# Patient Record
(demographics unavailable — no encounter records)

---

## 2024-10-14 NOTE — ASSESSMENT
[FreeTextEntry1] : #HFrEF #ICM s/p CABG (LIMA-LAD, SVG-D1, SVG-RPL) #HTN #HLD #DM #PAD He continues to do well from a cardiac perspective but continues to have ongoing PAD issues. He has ischecmi ACC heart failure stage C and I estimate his functional status as NYHA class III. He is scheduled for further revascularization this week.  On exam he is warm, well perfused, and euvolemic. He does not require standing diuretics.  We discussed importance of further optimizing GDMT.  BP/HR with room for improvement.  Will increase metoprolol succinate to 50 mg daily and Entresto to 49-51 mg twice daily.  He is already on Jardiance 25 mg daily.  Since he is doing well and LV function has recovered, will hold off on MRA at this time.  Plan -GDMT BB-increase metoprolol succinate 50 mg daily ACEi/ARB/ARNI-increase Entresto 49-51 mg twice daily  SGLT2i- Jardiance 25 mg daily MRA- can hold off for now as he is doing well and LV function has improved Diuretic- none Device- none, LVEF 48% -Continue ASA/Plavix post CABG -Continue high intensity statin -RTC in 4-6 months -Vascular management for PAD  Koby Ventura MD Interventional Cardiology/Advanced Heart Failure Transplant [facility].

## 2024-10-14 NOTE — CARDIOLOGY SUMMARY
[de-identified] : NSR HR 60, anterior q waves [de-identified] : TTE 4/2024 LVEF 48%, Apical septal segment, apical inferior segment, and apex are abnormal.  12/10/23 1. Moderately to severely decreased global left ventricular systolic function.  2. LV Ejection Fraction by Adair's Method with a biplane EF of 36 %.  3. Spectral Doppler shows impaired relaxation pattern of left ventricular myocardial filling (Grade I diastolic dysfunction).  4. Mid anterior and anteroapical hypokinesis inferoapical and anterolateral hypokinesis.  5. Mildly enlarged left atrium.  6. Mild mitral valve regurgitation.  7. Sclerotic aortic valve with normal opening.   [de-identified] : 12/8/2023 LM: Mild disease LAD: 99% mid disease at the bifurcation with D1. D1: 99% ostial disease RI: Mild disease CX: 99% ostial disease OM1: Moderate disease RCA: 90% mid disease. 90% distal disease RPDA: Moderate disease RPL: Moderate disease   [de-identified] : R: diffuse calcification. SFA is occluded. Popliteal with severely diminished flow L: diffuse calcification. SFA is occluded. Popliteal with severely diminished flow

## 2024-10-14 NOTE — CARDIOLOGY SUMMARY
[de-identified] : NSR HR 60, anterior q waves [de-identified] : TTE 4/2024 LVEF 48%, Apical septal segment, apical inferior segment, and apex are abnormal.  12/10/23 1. Moderately to severely decreased global left ventricular systolic function.  2. LV Ejection Fraction by Adair's Method with a biplane EF of 36 %.  3. Spectral Doppler shows impaired relaxation pattern of left ventricular myocardial filling (Grade I diastolic dysfunction).  4. Mid anterior and anteroapical hypokinesis inferoapical and anterolateral hypokinesis.  5. Mildly enlarged left atrium.  6. Mild mitral valve regurgitation.  7. Sclerotic aortic valve with normal opening.   [de-identified] : 12/8/2023 LM: Mild disease LAD: 99% mid disease at the bifurcation with D1. D1: 99% ostial disease RI: Mild disease CX: 99% ostial disease OM1: Moderate disease RCA: 90% mid disease. 90% distal disease RPDA: Moderate disease RPL: Moderate disease   [de-identified] : R: diffuse calcification. SFA is occluded. Popliteal with severely diminished flow L: diffuse calcification. SFA is occluded. Popliteal with severely diminished flow

## 2024-10-14 NOTE — HISTORY OF PRESENT ILLNESS
[FreeTextEntry1] : 75 y/o male PMH ICM. CAD, HFrEF LVEF 35% PAD, HTN, HLD, DM who presents to establish care.  Patient was admitted from 12/8/2023-12/12/2023 from cardiology clinic. He had originally presented for preoperative evaluation prior to possible vascular surgery. He was sent immediately for urgent cath from the office due to a concerning ECG with anterior CASIMIRO and anterior Q waves. Patient notably had been overall asymptomatic from anginal perspective and troponin was never elevated. LHC demonstrated severe 3VD and echo revealed moderately depressed LV systolic function. After heart team discussion, decision made to pursue CABG as outpatient while optimizing his heart failure.  Presented to HF clinic on 12/27/23 to establish care. He has been doing well since his hospital admission. He reports no limitation in his activities except for pain in his legs. He does note occasional fatigue. He has been compliant with his medications and reports no side effects. His weight has been stable at 153 lbs. He has a good appetite. He underwent successful CABG (LIMA-LAD, SVG-D1, SVG-RPL) on 2/5/24 with Dr. Christopher. His Entresto was stopped at discharge due to hypotension and metoprolol was decreased. Last saw Dr. Christopher and Dr. Anderson on 2/15/24 for post op follow up and reportedly doing well. Clinic visit 2/21/24 for follow up. He is recovering well from his surgery and is overall doing well. He is participating in rehab. He does continue to have LE pain/fatigue when walking. Metoprolol and Entresto were restarted. He underwent R Iliac intervention with vascular in April 2024. Clinic visit 5/22/24.. He continues to do well from a cardiac standpoint but continues to have significant claudication of his LLE. Follow up TTE in April revealed in improvement in LV function with LVEF 48%. Unfortunately, he reports no significant improvement of his claudication of his RLE. His functional status is mainly limited by his leg pain. He is scheduled for angiogram of LLE next month. He is compliant with his medications. No changes made.  Clinic visit 10/14/24.  He has been doing well since his last visit.  He reports no change in functional status.  His only limitation has been his right leg.  He is scheduled for repeat right peripheral intervention.  He has been compliant with his medications.  SBP ~130.  Weight stable ~150. Denies fatigue, dizziness, lightheadedness, syncope, or presyncope. Denies chest discomfort, dyspnea, palpitations. Denies weight gain, edema, PND, orthopnea, bendopnea. Denies abdominal pain, n/v/d/c. Denies bleeding. Denies fevers/chills, lymph nodes, or skin changes. No recent hospitalizations.

## 2024-10-14 NOTE — ASSESSMENT
[FreeTextEntry1] : #HFrEF #ICM s/p CABG (LIMA-LAD, SVG-D1, SVG-RPL) #HTN #HLD #DM #PAD He continues to do well from a cardiac perspective but continues to have ongoing PAD issues. He has ischecmi ACC heart failure stage C and I estimate his functional status as NYHA class III. He is scheduled for further revascularization this week.  On exam he is warm, well perfused, and euvolemic. He does not require standing diuretics.  We discussed importance of further optimizing GDMT.  BP/HR with room for improvement.  Will increase metoprolol succinate to 50 mg daily and Entresto to 49-51 mg twice daily.  He is already on Jardiance 25 mg daily.  Since he is doing well and LV function has recovered, will hold off on MRA at this time.  Plan -GDMT BB-increase metoprolol succinate 50 mg daily ACEi/ARB/ARNI-increase Entresto 49-51 mg twice daily  SGLT2i- Jardiance 25 mg daily MRA- can hold off for now as he is doing well and LV function has improved Diuretic- none Device- none, LVEF 48% -Continue ASA/Plavix post CABG -Continue high intensity statin -RTC in 4-6 months -Vascular management for PAD  Koby Ventura MD Interventional Cardiology/Advanced Heart Failure Transplant [facility].

## 2024-10-14 NOTE — HISTORY OF PRESENT ILLNESS
[FreeTextEntry1] : 73 y/o male PMH ICM. CAD, HFrEF LVEF 35% PAD, HTN, HLD, DM who presents to establish care.  Patient was admitted from 12/8/2023-12/12/2023 from cardiology clinic. He had originally presented for preoperative evaluation prior to possible vascular surgery. He was sent immediately for urgent cath from the office due to a concerning ECG with anterior CASIMIRO and anterior Q waves. Patient notably had been overall asymptomatic from anginal perspective and troponin was never elevated. LHC demonstrated severe 3VD and echo revealed moderately depressed LV systolic function. After heart team discussion, decision made to pursue CABG as outpatient while optimizing his heart failure.  Presented to HF clinic on 12/27/23 to establish care. He has been doing well since his hospital admission. He reports no limitation in his activities except for pain in his legs. He does note occasional fatigue. He has been compliant with his medications and reports no side effects. His weight has been stable at 153 lbs. He has a good appetite. He underwent successful CABG (LIMA-LAD, SVG-D1, SVG-RPL) on 2/5/24 with Dr. Christopher. His Entresto was stopped at discharge due to hypotension and metoprolol was decreased. Last saw Dr. Christopher and Dr. Anderson on 2/15/24 for post op follow up and reportedly doing well. Clinic visit 2/21/24 for follow up. He is recovering well from his surgery and is overall doing well. He is participating in rehab. He does continue to have LE pain/fatigue when walking. Metoprolol and Entresto were restarted. He underwent R Iliac intervention with vascular in April 2024. Clinic visit 5/22/24.. He continues to do well from a cardiac standpoint but continues to have significant claudication of his LLE. Follow up TTE in April revealed in improvement in LV function with LVEF 48%. Unfortunately, he reports no significant improvement of his claudication of his RLE. His functional status is mainly limited by his leg pain. He is scheduled for angiogram of LLE next month. He is compliant with his medications. No changes made.  Clinic visit 10/14/24.  He has been doing well since his last visit.  He reports no change in functional status.  His only limitation has been his right leg.  He is scheduled for repeat right peripheral intervention.  He has been compliant with his medications.  SBP ~130.  Weight stable ~150. Denies fatigue, dizziness, lightheadedness, syncope, or presyncope. Denies chest discomfort, dyspnea, palpitations. Denies weight gain, edema, PND, orthopnea, bendopnea. Denies abdominal pain, n/v/d/c. Denies bleeding. Denies fevers/chills, lymph nodes, or skin changes. No recent hospitalizations.

## 2024-11-11 NOTE — HISTORY OF PRESENT ILLNESS
[FreeTextEntry1] : 73 y/o male PMH ICM. CAD, HFrEF LVEF 35% PAD, HTN, HLD, DM who presents to establish care.  Patient was admitted from 12/8/2023-12/12/2023 from cardiology clinic. He had originally presented for preoperative evaluation prior to possible vascular surgery. He was sent immediately for urgent cath from the office due to a concerning ECG with anterior CASIMIRO and anterior Q waves. Patient notably had been overall asymptomatic from anginal perspective and troponin was never elevated. LHC demonstrated severe 3VD and echo revealed moderately depressed LV systolic function. After heart team discussion, decision made to pursue CABG as outpatient while optimizing his heart failure.  Presented to HF clinic on 12/27/23 to establish care. He has been doing well since his hospital admission. He reports no limitation in his activities except for pain in his legs. He does note occasional fatigue. He has been compliant with his medications and reports no side effects. His weight has been stable at 153 lbs. He has a good appetite. He underwent successful CABG (LIMA-LAD, SVG-D1, SVG-RPL) on 2/5/24 with Dr. Christopher. His Entresto was stopped at discharge due to hypotension and metoprolol was decreased. Last saw Dr. Christopher and Dr. Anderson on 2/15/24 for post op follow up and reportedly doing well. Clinic visit 2/21/24 for follow up. He is recovering well from his surgery and is overall doing well. He is participating in rehab. He does continue to have LE pain/fatigue when walking. Metoprolol and Entresto were restarted. He underwent R Iliac intervention with vascular in April 2024. Clinic visit 5/22/24.. He continues to do well from a cardiac standpoint but continues to have significant claudication of his LLE. Follow up TTE in April revealed in improvement in LV function with LVEF 48%. Unfortunately, he reports no significant improvement of his claudication of his RLE. His functional status is mainly limited by his leg pain. He is scheduled for angiogram of LLE next month. He is compliant with his medications. No changes made. Clinic visit 10/14/24. He has been doing well since his last visit. He reports no change in functional status. His only limitation has been his right leg. He is scheduled for repeat right peripheral intervention. He has been compliant with his medications. SBP ~130. Weight stable ~150. Entresto increased to 49-51 mg BID. Hospital admission 10/16/2024 - 10/24/2024 for for R lower extremity peripheral intervention.  Course was complicated by left renal hematoma requiring emergent IR embolization.  Etiology of hematoma is unclear.  Subsequent hospital admission 11/6/2024 - 11/10/2024 for C. difficile colitis.  Clinic visit 11/11/2024. Since his multiple and prolonged hospitalizations he has felt weak and tired.  However he is still in good spirits and is slowly recovering.  During his admission patient with significant lower extremity swelling likely in the setting of fluid resuscitation.  Patient reportedly had been told to restart Entresto at home and Bumex.  These medications were on hold during his hospital admission given his critical illness/SUMIT and CKD. Patient reports that his lower extremity swelling has improved. Diarrhea has improved and still has a short course of PO antibiotics for C. diff. Denies dizziness, lightheadedness, syncope, or presyncope. Denies chest discomfort, dyspnea, palpitations. Denies weight gain, PND, orthopnea, bendopnea. Denies abdominal pain, n/v/d/c. Denies bleeding. Denies fevers/chills, lymph nodes, or skin changes.

## 2024-11-11 NOTE — ASSESSMENT
[FreeTextEntry1] : #HFrEF #ICM s/p CABG (LIMA-LAD, SVG-D1, SVG-RPL) #HTN #HLD #DM #PAD He has had a difficult month with a prolonged hospitalization.  Fortunately he is recovering and did not suffer significant damage.  He has ischemic ACC heart failure stage C and I estimate he has functional status NYHA class III. We discussed focusing on recovering his strength after his prolonged hospitalization.  TTE during hospitalization demonstrated low normal LV systolic function.  We discussed slowly reintroducing GDMT as he recovers from his SUMIT on CKD.  On exam he is warm, well perfused, and euvolemic. Lower extremity swelling likely from aggressive fluid resuscitation from his hospitalization which has now improved.  He has not required standing diuretics in the past and at this time I do not think he requires ongoing diuresis.  Unclear when his Entresto was restarted but most recent labs demonstrate creatinine 2.1 which is slightly above baseline 1.6.  It appears that he is tolerating Entresto with acceptable increase in creatinine and so we will continue at current dose of 49-51 mg twice daily.  He was also restarted on Jardiance 25 mg daily.  HR reasonable today will continue metoprolol succinate at current dose.  Will continue to hold off on MRA as LV function has improved and given CKD would likely not benefit at this time.  He is obtaining labs today from nephrology and can recheck creatinine.  Plan -GDMT BB-metoprolol succinate 50 mg daily ACEi/ARB/ARNI-Entresto 49-51 mg twice daily SGLT2i- Jardiance 25 mg daily MRA- none as LV function has improved Diuretic- stop diuretics Device- none -Continue ASA/Plavix post CABG -Continue high intensity statin -F/u with vascular and nephrology -RTC in 3 months  Koby Ventura MD Interventional Cardiology/Advanced Heart Failure Transplant [facility] - [facility]

## 2024-11-11 NOTE — CARDIOLOGY SUMMARY
[de-identified] : NSR HR 82 [de-identified] : TTE 10/2024: LVEF 50-55%, inferior WMA  4/2024 LVEF 48%, Apical septal segment, apical inferior segment, and apex are abnormal.  12/10/23 1. Moderately to severely decreased global left ventricular systolic function.  2. LV Ejection Fraction by Adair's Method with a biplane EF of 36 %.  3. Spectral Doppler shows impaired relaxation pattern of left ventricular myocardial filling (Grade I diastolic dysfunction).  4. Mid anterior and anteroapical hypokinesis inferoapical and anterolateral hypokinesis.  5. Mildly enlarged left atrium.  6. Mild mitral valve regurgitation.  7. Sclerotic aortic valve with normal opening.  [de-identified] : 12/8/2023 LM: Mild disease LAD: 99% mid disease at the bifurcation with D1. D1: 99% ostial disease RI: Mild disease CX: 99% ostial disease OM1: Moderate disease RCA: 90% mid disease. 90% distal disease RPDA: Moderate disease RPL: Moderate disease   [de-identified] : R: diffuse calcification. SFA is occluded. Popliteal with severely diminished flow L: diffuse calcification. SFA is occluded. Popliteal with severely diminished flow

## 2024-11-11 NOTE — ASSESSMENT
[FreeTextEntry1] : #HFrEF #ICM s/p CABG (LIMA-LAD, SVG-D1, SVG-RPL) #HTN #HLD #DM #PAD He has had a difficult month with a prolonged hospitalization.  Fortunately he is recovering and did not suffer significant damage.  He has ischemic ACC heart failure stage C and I estimate he has functional status NYHA class III. We discussed focusing on recovering his strength after his prolonged hospitalization.  TTE during hospitalization demonstrated low normal LV systolic function.  We discussed slowly reintroducing GDMT as he recovers from his SUMIT on CKD.  On exam he is warm, well perfused, and euvolemic. Lower extremity swelling likely from aggressive fluid resuscitation from his hospitalization which has now improved.  He has not required standing diuretics in the past and at this time I do not think he requires ongoing diuresis.  Unclear when his Entresto was restarted but most recent labs demonstrate creatinine 2.1 which is slightly above baseline 1.6.  It appears that he is tolerating Entresto with acceptable increase in creatinine and so we will continue at current dose of 49-51 mg twice daily.  He was also restarted on Jardiance 25 mg daily.  HR reasonable today will continue metoprolol succinate at current dose.  Will continue to hold off on MRA as LV function has improved and given CKD would likely not benefit at this time.  He is obtaining labs today from nephrology and can recheck creatinine.  Plan -GDMT BB-metoprolol succinate 50 mg daily ACEi/ARB/ARNI-Entresto 49-51 mg twice daily SGLT2i- Jardiance 25 mg daily MRA- none as LV function has improved Diuretic- stop diuretics Device- none -Continue ASA/Plavix post CABG -Continue high intensity statin -F/u with vascular and nephrology -RTC in 3 months  Koby Ventura MD Interventional Cardiology/Advanced Heart Failure Transplant [facility]

## 2024-11-11 NOTE — CARDIOLOGY SUMMARY
[de-identified] : NSR HR 82 [de-identified] : TTE 10/2024: LVEF 50-55%, inferior WMA  4/2024 LVEF 48%, Apical septal segment, apical inferior segment, and apex are abnormal.  12/10/23 1. Moderately to severely decreased global left ventricular systolic function.  2. LV Ejection Fraction by Adair's Method with a biplane EF of 36 %.  3. Spectral Doppler shows impaired relaxation pattern of left ventricular myocardial filling (Grade I diastolic dysfunction).  4. Mid anterior and anteroapical hypokinesis inferoapical and anterolateral hypokinesis.  5. Mildly enlarged left atrium.  6. Mild mitral valve regurgitation.  7. Sclerotic aortic valve with normal opening.  [de-identified] : 12/8/2023 LM: Mild disease LAD: 99% mid disease at the bifurcation with D1. D1: 99% ostial disease RI: Mild disease CX: 99% ostial disease OM1: Moderate disease RCA: 90% mid disease. 90% distal disease RPDA: Moderate disease RPL: Moderate disease   [de-identified] : R: diffuse calcification. SFA is occluded. Popliteal with severely diminished flow L: diffuse calcification. SFA is occluded. Popliteal with severely diminished flow

## 2024-11-18 NOTE — ASSESSMENT
[FreeTextEntry1] : #) CKD stage G3b - will continue to monitor progression of renal dysfunction. The most recent SCr is 2.2 mg/dL with an eGFR of 30 ml/min, which is stable from prior reading of SCr 2.1 mg/dL and eGFR 32 ml/min two weeks prior - etiology of underlying CKD is most likely 2/2 HTN nephrosclerosis vs renovascular disease - will monitor acid base; no bicarbonate supplementation is required at current HCO3 - continue to monitor urine studies for proteinuria - continue to monitor and treat hypercholesterolemia to goal LDL <100  #) CKD - Mineral and Bone disease - continue to monitor Vit D, Phos and PTH for mineral bone disease - start Ergocalciferol 50k weekly x 8 weeks  #) Essential HTN: BP controlled - advised salt restrictive diet of <2.4gm daily - advised to monitor home BP readings and report in future clinic appointments  #) Edema - will start Bumex 0.5mg prn for leg swelling  #) NIDDM: A1c controlled - Continue current use of Lantus 10u Qhs and Jardiance 25mg daily  #) Dyslipidemia: Lipid Panel controlled - Continue use of Statin therapy.  #) Anemia Due to CKD and possibly an acute drop from recent left renal hematoma Hb low, below goal of 10.5 - Checking Iron studies (Ferritin, Iron, TIBC) - Repeating CBC prior to next appointment - Restarting Ferrous Sulfate 325mg daily

## 2024-11-18 NOTE — HISTORY OF PRESENT ILLNESS
[FreeTextEntry1] : Willis Young is a 75 yo M with history of CAD s/p CABG Feb 2024, HFrEF (last EF 50-55% Oct 2024), PAD with right iliac intervention April 2024, current tobacco use, PVD, T2DM, here for follow-up of CKD stage 3A.  Recently hospitalized for worsening leg swelling, attempted revascularization of the LLE leg to left renal hematoma and SUMIT, discharged with sCr now in the low 2's.  Also was hospitalized for C Diff colitis for about a week immediately after.  He was taken off Bumex recently and he notes worsening leg swelling over the last week.  Recently saw Cardiology   Recent Hospitalizations: none Recent Medications changes: none NSAIDS use: denies use  Home BP: no abnormal readings noted  Home FSBS:  no abnormal readings noted  LUTS: denies LUTS, foamy urine or hematuria  Uremic Symptoms: no pruritis, metallic taste, new onset weakness, dysphagia, poor appetite, or tremors noted  FamHx CKD/RRT:  denies  Personal History of CKD/RRT:  denies

## 2024-11-18 NOTE — HISTORY OF PRESENT ILLNESS
[FreeTextEntry1] : Willis Young is a 73 yo M with history of CAD s/p CABG Feb 2024, HFrEF (last EF 50-55% Oct 2024), PAD with right iliac intervention April 2024, current tobacco use, PVD, T2DM, here for follow-up of CKD stage 3A.  Recently hospitalized for worsening leg swelling, attempted revascularization of the LLE leg to left renal hematoma and SUMIT, discharged with sCr now in the low 2's.  Also was hospitalized for C Diff colitis for about a week immediately after.  He was taken off Bumex recently and he notes worsening leg swelling over the last week.  Recently saw Cardiology   Recent Hospitalizations: none Recent Medications changes: none NSAIDS use: denies use  Home BP: no abnormal readings noted  Home FSBS:  no abnormal readings noted  LUTS: denies LUTS, foamy urine or hematuria  Uremic Symptoms: no pruritis, metallic taste, new onset weakness, dysphagia, poor appetite, or tremors noted  FamHx CKD/RRT:  denies  Personal History of CKD/RRT:  denies

## 2025-01-13 NOTE — HISTORY OF PRESENT ILLNESS
[FreeTextEntry1] : Willis Young is a 73 yo M with history of CAD s/p CABG Feb 2024, HFrEF (last EF 50-55% Oct 2024), PAD with right iliac intervention April 2024, current tobacco use, PVD, T2DM, here for follow-up of CKD stage 3A.  Renal function stable with noted sCr 2.2->2.2 in the last 3 months.  RBUS noting stable size of his left renal hematoma, with no LUTS or gross hematuria reported by the patient today.  He notes he is not currently taking Bumex and Vancomycin po has been stopped.  Recent Hospitalizations: none Recent Medications changes: none NSAIDS use: denies use  Home BP: no abnormal readings noted  Home FSBS:  no abnormal readings noted  LUTS: denies LUTS, foamy urine or hematuria  Uremic Symptoms: no pruritis, metallic taste, new onset weakness, dysphagia, poor appetite, or tremors noted  FamHx CKD/RRT:  denies  Personal History of CKD/RRT:  denies

## 2025-01-13 NOTE — ASSESSMENT
[FreeTextEntry1] : #) CKD stage G3bA2: - will continue to monitor progression of renal dysfunction. The most recent SCr is 2.2 mg/dL with an eGFR of 30 ml/min, stable from prior reading - etiology of underlying CKD is most likely 2/2 HTN nephrosclerosis vs renovascular disease - will monitor acid base; no bicarbonate supplementation is required at current HCO3 - continue to monitor urine studies for proteinuria - continue to monitor and treat hypercholesterolemia to goal LDL <100  #) CKD - Mineral and Bone disease - continue to monitor Vit D, Phos and PTH for mineral bone disease - continue Ergocalciferol 50k weekly x 8 weeks - after 8 weeks advised patient to switch to 2000u daily Vit D OTC  #) Essential HTN: BP controlled - advised salt restrictive diet of <2.4gm daily - advised to monitor home BP readings and report in future clinic appointments  #) Edema - resolved, not currently taking Bumex - will CTM  #) NIDDM: A1c controlled on last check - Continue current use of Lantus 10u Qhs and Jardiance 25mg daily  #) Dyslipidemia: Lipid Panel controlled - Continue use of Statin therapy  #) Anemia Due to CKD and possibly an acute drop from recent left renal hematoma Hb improved to 12.8 g/dL on Ferrous Sulfate - continue Ferrous Sulfate 325mg daily

## 2025-02-05 NOTE — ASSESSMENT
[FreeTextEntry1] : Mr. Young is a 75-year-old man with history of multivessel CAD s/p CABG 1/2024, severe peripheral arterial disease, DM2, and tobacco use (quit 2024) presenting for follow up.  Impression: (1) HFrEF 2/2 ICM with EF 36%, improved to 50-55% with revascularization and GDMT, warm and dry on exam (2) Multivessel CAD with CABG 2/2024, Dr. Christopher (3) PAD with bilateral SFA occlusions, s/p intervention with Dr. Esqueda 2024 (4) DM2 (5) CKD, following with renal, had a left renal hematoma after a peripheral intervention requiring emergent IR embolization, stable (6) Prior tobacco use  Plan: - Discussed DM2 control in detail - Continue GDMT, limited by hypotension: BB, ARNI, SGLT2i - Continue DAPT - Continue statin - Vascular follow up re: severe PAD.  RTC 2-3 months with labs

## 2025-02-05 NOTE — HISTORY OF PRESENT ILLNESS
[FreeTextEntry1] : Mr. Young is a 75-year-old man with history of multivessel CAD s/p CABG 1/2024, severe peripheral arterial disease, DM2, and tobacco use (recently quit) presenting for follow up.  Patient reports generally being in decent health. His main issue over the past year is worsening exertional leg pain. He follows with Dr. Esqueda and had LE arterial imaging showing bilateral SFA occlusions and severe tibial disease. He was planned for LE angio and revascularization and in pre-op testing had an abnormal ECG prompting urgent cardiology referral. I saw him on 12/8/23 and noted ECG findings consistent with recent anterior MI and referred him directly to the ED for evaluation, where he was found to have severe 3-vessel CAD along with a reduced EF of 35-40%.  On 2/5/24 he underwent CABG with Dr. Christopher with course complicated by acute blood loss with hypotension prompting transfusion of 1u pRBCs.  Earlier this year underwent RLE angiogram with Dr. Esqueda with right SFA and right iliac artery angioplasty/stenting. A subsequent vascular procedure was complicated by a renal hematoma. He is following with nephrology. Today feels well, denies any active cardiopulmonary complaints.  LHC 12/8/23 - LM luminal irregularities - pLAD 50-70%, mLAD 95% - oLCx 95% - mRCA 85%, dRCA 90%  TTE 12/2023 - EF 36%, G1DD, mid-anterior and anteroapical, and inferoapical/anterolateral hypokinesis, mild LA enlargement, mild MR, sclerotic Ao TTE 4/2024: EF 48%, RWMAs unchanged, no significant valvular disease TTE 10/2024: EF 50-55%, RWMA (apex, apical inferior, inferoseptum, basal/mid inferior wall), mild LA dilatation  Carotid Doppler 12/2023 - Mild bilateral carotid artery stenosis  Labs: - , TG 59, HDL 40, LDL 74, non-HDL 90, lp(a) 64 - Cr 1.6, BUN 55 -> Cr 2.2, BUN 64; K 5.1, normal transaminases - Hgb 13.1, Plt 410 - pBNP 7304 10/2024 -> 2432 1/2025, A1c 7.8%, TSH 6.04, fT3 1.0, hsCRP 11.2  Meds: - ASA - Plavix - Atorva 40mg - Jardiance 25mg - Entresto 49-51mg BID - Metoprolol succinate 50mg - Metformin - Insulin

## 2025-03-06 NOTE — REASON FOR VISIT
[Initial Evaluation] : an initial evaluation [DM Type 2] : DM Type 2 [Family Member] : family member [Other: _____] : [unfilled] [FreeTextEntry2] : Dr. Sexton

## 2025-03-06 NOTE — PHYSICAL EXAM
[Alert] : alert [Well Nourished] : well nourished [Healthy Appearance] : healthy appearance [No Acute Distress] : no acute distress [Well Developed] : well developed [Normal Sclera/Conjunctiva] : normal sclera/conjunctiva [EOMI] : extra ocular movement intact [PERRL] : pupils equal, round and reactive to light [No Proptosis] : no proptosis [Normal Outer Ear/Nose] : the ears and nose were normal in appearance [Normal Hearing] : hearing was normal [Supple] : the neck was supple [Thyroid Not Enlarged] : the thyroid was not enlarged [No Respiratory Distress] : no respiratory distress [No Accessory Muscle Use] : no accessory muscle use [Normal Rate and Effort] : normal respiratory rate and effort [Clear to Auscultation] : lungs were clear to auscultation bilaterally [Normal S1, S2] : normal S1 and S2 [Normal Rate] : heart rate was normal [Regular Rhythm] : with a regular rhythm [Carotids Normal] : carotid pulses were normal with no bruits [No Edema] : no peripheral edema [Normal Bowel Sounds] : normal bowel sounds [Not Tender] : non-tender [Not Distended] : not distended [Soft] : abdomen soft [Normal Anterior Cervical Nodes] : no anterior cervical lymphadenopathy [Normal Posterior Cervical Nodes] : no posterior cervical lymphadenopathy [No CVA Tenderness] : no ~M costovertebral angle tenderness [No Spinal Tenderness] : no spinal tenderness [Spine Straight] : spine straight [Kyphosis] : no kyphosis present [Scoliosis] : no scoliosis [No Stigmata of Cushings Syndrome] : no stigmata of Cushings Syndrome [Normal Gait] : normal gait [Normal Strength/Tone] : muscle strength and tone were normal [No Rash] : no rash [Acanthosis Nigricans] : no acanthosis nigricans [No Motor Deficits] : the motor exam was normal [Normal Reflexes] : deep tendon reflexes were 2+ and symmetric [No Tremors] : no tremors [Oriented x3] : oriented to person, place, and time [Normal Affect] : the affect was normal [Normal Mood] : the mood was normal [de-identified] : DM2

## 2025-03-06 NOTE — HISTORY OF PRESENT ILLNESS
[FreeTextEntry1] : Patient refer due to DM2 x 15 years. He said his hgba1 was 1.0 originally today 7.1 [Finger Stick] : Finger Stick: Yes [Hypoglycemia] : Patient is not hypoglycemic. [FreeTextEntry9] : 101-110

## 2025-03-06 NOTE — ASSESSMENT
[Diabetes Foot Care] : diabetes foot care [Long Term Vascular Complications] : long term vascular complications of diabetes [Carbohydrate Consistent Diet] : carbohydrate consistent diet [Importance of Diet and Exercise] : importance of diet and exercise to improve glycemic control, achieve weight loss and improve cardiovascular health [Exercise/Effect on Glucose] : exercise/effect on glucose [Hypoglycemia Management] : hypoglycemia management [Glucagon Use] : glucagon use [Ketone Testing] : ketone testing [Action and use of Insulin] : action and use of short and long-acting insulin [Self Monitoring of Blood Glucose] : self monitoring of blood glucose [Insulin Self-Administration] : insulin self-administration [Injection Technique, Storage, Sharps Disposal] : injection technique, storage, and sharps disposal [Sick-Day Management] : sick-day management [Retinopathy Screening] : Patient was referred to ophthalmology for retinopathy screening [Diabetic Medications] : Risks and benefits of diabetic medications were discussed [FreeTextEntry4] : 1800 josué ada diet [FreeTextEntry1] : 1. DM2 Lantus 10 unit increase to 12 units Jardiance 25 mg daily Refer to Ophthalmology refer to podiatry follow up with Nephrology dexcom 7

## 2025-03-12 NOTE — ASSESSMENT
[FreeTextEntry1] : #HFrEF #ICM s/p CABG (LIMA-LAD, SVG-D1, SVG-RPL) #HTN #HLD #DM #PAD He is doing well since his last clinic visit.  He has ischemic ACC heart failure stage C and stable NYHA class III symptoms.  His peripheral arterial disease symptoms has improved and thankfully he has no more further interventions.  He will continue to following with vascular.  His most recent TTE low normal LV systolic function.  Will repeat annual TTE around October.  We discussed continuing to optimize his GDMT however since he is doing quite well he is slightly hesitant.  Labs reviewed from 1/2025, creatinine 2.2 which is around baseline.  Potassium 5.  proBNP 2400.  Patient reports A1c had slightly increased to 7.1% recently.  On exam he is warm, well-perfused, and euvolemic.  He does not require standing diuretics.  We discussed optimizing his GDMT as his BP is slightly elevated today but he would rather not make any changes today which I think is reasonable since he is doing well overall. I would have like to increase Entresto or even consider switching to carvedilol to avoid further affecting his kidneys. He will follow-up in clinic at the end of the year.  Plan -GDMT BB-metoprolol succinate 50 mg daily ACEi/ARB/ARNI-Entresto 49-51 mg twice daily SGLT2i- Jardiance 25 mg daily MRA- none as LV function has improved and CKD Diuretic- none Device- none -Continue ASA/Plavix post CABG/peripheral intervention -Continue high intensity statin -F/u with vascular, nephrology, endocrinology -RTC in 9 months  Koby Ventura MD Interventional Cardiology/Advanced Heart Failure Transplant [facility]

## 2025-03-12 NOTE — HISTORY OF PRESENT ILLNESS
[FreeTextEntry1] : 75 y/o male PMH ICM. CAD, HFrEF LVEF 35% PAD, HTN, HLD, DM who presents to establish care.  Patient was admitted from 12/8/2023-12/12/2023 from cardiology clinic. He had originally presented for preoperative evaluation prior to possible vascular surgery. He was sent immediately for urgent cath from the office due to a concerning ECG with anterior CASIMIRO and anterior Q waves. Patient notably had been overall asymptomatic from anginal perspective and troponin was never elevated. LHC demonstrated severe 3VD and echo revealed moderately depressed LV systolic function. After heart team discussion, decision made to pursue CABG as outpatient while optimizing his heart failure.  Presented to HF clinic on 12/27/23 to establish care. He has been doing well since his hospital admission. He reports no limitation in his activities except for pain in his legs. He does note occasional fatigue. He has been compliant with his medications and reports no side effects. His weight has been stable at 153 lbs. He has a good appetite. He underwent successful CABG (LIMA-LAD, SVG-D1, SVG-RPL) on 2/5/24 with Dr. Christopher. His Entresto was stopped at discharge due to hypotension and metoprolol was decreased. Last saw Dr. Christopher and Dr. Anderson on 2/15/24 for post op follow up and reportedly doing well. Clinic visit 2/21/24 for follow up. He is recovering well from his surgery and is overall doing well. He is participating in rehab. He does continue to have LE pain/fatigue when walking. Metoprolol and Entresto were restarted. He underwent R Iliac intervention with vascular in April 2024. Clinic visit 5/22/24.. He continues to do well from a cardiac standpoint but continues to have significant claudication of his LLE. Follow up TTE in April revealed in improvement in LV function with LVEF 48%. Unfortunately, he reports no significant improvement of his claudication of his RLE. His functional status is mainly limited by his leg pain. He is scheduled for angiogram of LLE next month. He is compliant with his medications. No changes made. Clinic visit 10/14/24. He has been doing well since his last visit. He reports no change in functional status. His only limitation has been his right leg. He is scheduled for repeat right peripheral intervention. He has been compliant with his medications. SBP ~130. Weight stable ~150. Entresto increased to 49-51 mg BID. Hospital admission 10/16/2024 - 10/24/2024 for for R lower extremity peripheral intervention.  Course was complicated by left renal hematoma requiring emergent IR embolization.  Etiology of hematoma is unclear.  Subsequent hospital admission 11/6/2024 - 11/10/2024 for C. difficile colitis. Clinic visit 11/11/2024. Since his multiple and prolonged hospitalizations he has felt weak and tired.  However he is still in good spirits and is slowly recovering.  During his admission patient with significant lower extremity swelling likely in the setting of fluid resuscitation.  Patient reportedly had been told to restart Entresto at home and Bumex.  These medications were on hold during his hospital admission given his critical illness/SUMIT and CKD. Patient reports that his lower extremity swelling has improved. Diarrhea has improved and still has a short course of PO antibiotics for C. diff.  Clinic visit 3/123/25. He is doing well since his last visit.  He reports no new cardiac limitations.  Reports improvement in leg cramping since his most recent peripheral intervention.  He reports no more peripheral interventions planned.  He is now following with an endocrinologist.

## 2025-03-12 NOTE — PHYSICAL EXAM
[Well Developed] : well developed [Well Nourished] : well nourished [No Acute Distress] : no acute distress [Normal Conjunctiva] : normal conjunctiva [Normal Venous Pressure] : normal venous pressure [No Carotid Bruit] : no carotid bruit [Normal S1, S2] : normal S1, S2 [No Murmur] : no murmur [No Rub] : no rub [No Gallop] : no gallop [Clear Lung Fields] : clear lung fields [Good Air Entry] : good air entry [No Respiratory Distress] : no respiratory distress  [Soft] : abdomen soft [Non Tender] : non-tender [No Masses/organomegaly] : no masses/organomegaly [Normal Bowel Sounds] : normal bowel sounds [Normal Gait] : normal gait [No Cyanosis] : no cyanosis [No Clubbing] : no clubbing [No Varicosities] : no varicosities [Edema ___] : edema [unfilled] [No Rash] : no rash [No Skin Lesions] : no skin lesions [Moves all extremities] : moves all extremities [No Focal Deficits] : no focal deficits [Normal Speech] : normal speech [Alert and Oriented] : alert and oriented [Normal memory] : normal memory [No Edema] : no edema

## 2025-03-12 NOTE — HISTORY OF PRESENT ILLNESS
[FreeTextEntry1] : 73 y/o male PMH ICM. CAD, HFrEF LVEF 35% PAD, HTN, HLD, DM who presents to establish care.  Patient was admitted from 12/8/2023-12/12/2023 from cardiology clinic. He had originally presented for preoperative evaluation prior to possible vascular surgery. He was sent immediately for urgent cath from the office due to a concerning ECG with anterior CASIMIRO and anterior Q waves. Patient notably had been overall asymptomatic from anginal perspective and troponin was never elevated. LHC demonstrated severe 3VD and echo revealed moderately depressed LV systolic function. After heart team discussion, decision made to pursue CABG as outpatient while optimizing his heart failure.  Presented to HF clinic on 12/27/23 to establish care. He has been doing well since his hospital admission. He reports no limitation in his activities except for pain in his legs. He does note occasional fatigue. He has been compliant with his medications and reports no side effects. His weight has been stable at 153 lbs. He has a good appetite. He underwent successful CABG (LIMA-LAD, SVG-D1, SVG-RPL) on 2/5/24 with Dr. Christopher. His Entresto was stopped at discharge due to hypotension and metoprolol was decreased. Last saw Dr. Christopher and Dr. Anderson on 2/15/24 for post op follow up and reportedly doing well. Clinic visit 2/21/24 for follow up. He is recovering well from his surgery and is overall doing well. He is participating in rehab. He does continue to have LE pain/fatigue when walking. Metoprolol and Entresto were restarted. He underwent R Iliac intervention with vascular in April 2024. Clinic visit 5/22/24.. He continues to do well from a cardiac standpoint but continues to have significant claudication of his LLE. Follow up TTE in April revealed in improvement in LV function with LVEF 48%. Unfortunately, he reports no significant improvement of his claudication of his RLE. His functional status is mainly limited by his leg pain. He is scheduled for angiogram of LLE next month. He is compliant with his medications. No changes made. Clinic visit 10/14/24. He has been doing well since his last visit. He reports no change in functional status. His only limitation has been his right leg. He is scheduled for repeat right peripheral intervention. He has been compliant with his medications. SBP ~130. Weight stable ~150. Entresto increased to 49-51 mg BID. Hospital admission 10/16/2024 - 10/24/2024 for for R lower extremity peripheral intervention.  Course was complicated by left renal hematoma requiring emergent IR embolization.  Etiology of hematoma is unclear.  Subsequent hospital admission 11/6/2024 - 11/10/2024 for C. difficile colitis. Clinic visit 11/11/2024. Since his multiple and prolonged hospitalizations he has felt weak and tired.  However he is still in good spirits and is slowly recovering.  During his admission patient with significant lower extremity swelling likely in the setting of fluid resuscitation.  Patient reportedly had been told to restart Entresto at home and Bumex.  These medications were on hold during his hospital admission given his critical illness/SUMIT and CKD. Patient reports that his lower extremity swelling has improved. Diarrhea has improved and still has a short course of PO antibiotics for C. diff.  Clinic visit 3/123/25. He is doing well since his last visit.  He reports no new cardiac limitations.  Reports improvement in leg cramping since his most recent peripheral intervention.  He reports no more peripheral interventions planned.  He is now following with an endocrinologist.

## 2025-03-12 NOTE — CARDIOLOGY SUMMARY
[de-identified] : NSR [de-identified] : TTE 10/2024: LVEF 50-55%, inferior WMA  4/2024 LVEF 48%, Apical septal segment, apical inferior segment, and apex are abnormal.  12/10/23 1. Moderately to severely decreased global left ventricular systolic function.  2. LV Ejection Fraction by Adair's Method with a biplane EF of 36 %.  3. Spectral Doppler shows impaired relaxation pattern of left ventricular myocardial filling (Grade I diastolic dysfunction).  4. Mid anterior and anteroapical hypokinesis inferoapical and anterolateral hypokinesis.  5. Mildly enlarged left atrium.  6. Mild mitral valve regurgitation.  7. Sclerotic aortic valve with normal opening.  [de-identified] : 12/8/2023 LM: Mild disease LAD: 99% mid disease at the bifurcation with D1. D1: 99% ostial disease RI: Mild disease CX: 99% ostial disease OM1: Moderate disease RCA: 90% mid disease. 90% distal disease RPDA: Moderate disease RPL: Moderate disease   [de-identified] : R: diffuse calcification. SFA is occluded. Popliteal with severely diminished flow L: diffuse calcification. SFA is occluded. Popliteal with severely diminished flow

## 2025-03-12 NOTE — ASSESSMENT
[FreeTextEntry1] : #HFrEF #ICM s/p CABG (LIMA-LAD, SVG-D1, SVG-RPL) #HTN #HLD #DM #PAD He is doing well since his last clinic visit.  He has ischemic ACC heart failure stage C and stable NYHA class III symptoms.  His peripheral arterial disease symptoms has improved and thankfully he has no more further interventions.  He will continue to following with vascular.  His most recent TTE low normal LV systolic function.  Will repeat annual TTE around October.  We discussed continuing to optimize his GDMT however since he is doing quite well he is slightly hesitant.  Labs reviewed from 1/2025, creatinine 2.2 which is around baseline.  Potassium 5.  proBNP 2400.  Patient reports A1c had slightly increased to 7.1% recently.  On exam he is warm, well-perfused, and euvolemic.  He does not require standing diuretics.  We discussed optimizing his GDMT as his BP is slightly elevated today but he would rather not make any changes today which I think is reasonable since he is doing well overall. I would have like to increase Entresto or even consider switching to carvedilol to avoid further affecting his kidneys. He will follow-up in clinic at the end of the year.  Plan -GDMT BB-metoprolol succinate 50 mg daily ACEi/ARB/ARNI-Entresto 49-51 mg twice daily SGLT2i- Jardiance 25 mg daily MRA- none as LV function has improved and CKD Diuretic- none Device- none -Continue ASA/Plavix post CABG/peripheral intervention -Continue high intensity statin -F/u with vascular, nephrology, endocrinology -RTC in 9 months  Koby Ventura MD Interventional Cardiology/Advanced Heart Failure Transplant [facility] - [facility]

## 2025-03-12 NOTE — CARDIOLOGY SUMMARY
[de-identified] : NSR [de-identified] : TTE 10/2024: LVEF 50-55%, inferior WMA  4/2024 LVEF 48%, Apical septal segment, apical inferior segment, and apex are abnormal.  12/10/23 1. Moderately to severely decreased global left ventricular systolic function.  2. LV Ejection Fraction by Adair's Method with a biplane EF of 36 %.  3. Spectral Doppler shows impaired relaxation pattern of left ventricular myocardial filling (Grade I diastolic dysfunction).  4. Mid anterior and anteroapical hypokinesis inferoapical and anterolateral hypokinesis.  5. Mildly enlarged left atrium.  6. Mild mitral valve regurgitation.  7. Sclerotic aortic valve with normal opening.  [de-identified] : 12/8/2023 LM: Mild disease LAD: 99% mid disease at the bifurcation with D1. D1: 99% ostial disease RI: Mild disease CX: 99% ostial disease OM1: Moderate disease RCA: 90% mid disease. 90% distal disease RPDA: Moderate disease RPL: Moderate disease   [de-identified] : R: diffuse calcification. SFA is occluded. Popliteal with severely diminished flow L: diffuse calcification. SFA is occluded. Popliteal with severely diminished flow

## 2025-05-09 NOTE — ASSESSMENT
[FreeTextEntry1] : #) CKD stage G3bA2: - will continue to monitor progression of renal dysfunction. The most recent SCr is 2.1 mg/dL with an eGFR of 32 ml/min, stable in the 2.1-2.3 range over six months - etiology of underlying CKD is most likely 2/2 HTN nephrosclerosis vs renovascular disease - will monitor acid base; no bicarbonate supplementation is required at current HCO3 - continue to monitor urine studies for proteinuria - continue to monitor and treat hypercholesterolemia to goal LDL <100  #) CKD - Mineral and Bone disease - continue to monitor Phos and PTH for mineral bone disease  #) Essential HTN: BP controlled - advised salt restrictive diet of <2.4gm daily - advised to monitor home BP readings and report in future clinic appointments  #) Edema - resolved, not currently taking Bumex - will CTM  #) NIDDM: A1c controlled on last check - Continue current use of Lantus 10u Qhs and Jardiance 25mg daily  #) Dyslipidemia: Lipid Panel controlled - Continue use of Statin therapy  #) Anemia Due to CKD and possibly an acute drop from recent left renal hematoma Hb stable in the mid 12's Ferritin 753 with TSat 35% in Jan 2025 - CTM

## 2025-05-09 NOTE — HISTORY OF PRESENT ILLNESS
Routing refill request to provider for review/approval because:  Elevated BP      
[FreeTextEntry1] : Willis Young is a 73 yo M with history of CAD s/p CABG Feb 2024, HFrEF (last EF 50-55% Oct 2024), PAD with right iliac intervention April 2024, current tobacco use, PVD, T2DM, here for follow-up of CKD stage 3A.  Renal function stable with noted sCr 2.2->2.2 in the last 3 months.  RBUS noting stable size of his left renal hematoma, with no LUTS or gross hematuria reported by the patient today.  He notes he is not currently taking Bumex and Vancomycin po has been stopped.  Recent Hospitalizations: none Recent Medications changes: none NSAIDS use: denies use  Home BP: no abnormal readings noted  Home FSBS:  no abnormal readings noted  LUTS: denies LUTS, foamy urine or hematuria  Uremic Symptoms: no pruritis, metallic taste, new onset weakness, dysphagia, poor appetite, or tremors noted  FamHx CKD/RRT:  denies  Personal History of CKD/RRT:  denies